# Patient Record
Sex: FEMALE | Race: WHITE | NOT HISPANIC OR LATINO | Employment: OTHER | URBAN - METROPOLITAN AREA
[De-identification: names, ages, dates, MRNs, and addresses within clinical notes are randomized per-mention and may not be internally consistent; named-entity substitution may affect disease eponyms.]

---

## 2022-11-23 ENCOUNTER — HOSPITAL ENCOUNTER (EMERGENCY)
Facility: HOSPITAL | Age: 52
Discharge: HOME | End: 2022-11-23
Attending: EMERGENCY MEDICINE
Payer: MEDICARE

## 2022-11-23 VITALS
HEIGHT: 60 IN | TEMPERATURE: 98 F | SYSTOLIC BLOOD PRESSURE: 127 MMHG | RESPIRATION RATE: 17 BRPM | DIASTOLIC BLOOD PRESSURE: 70 MMHG | WEIGHT: 135 LBS | HEART RATE: 78 BPM | OXYGEN SATURATION: 98 % | BODY MASS INDEX: 26.5 KG/M2
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
## 2022-11-23 DIAGNOSIS — S93.511A SPRAIN OF INTERPHALANGEAL JOINT OF RIGHT GREAT TOE, INITIAL ENCOUNTER: ICD-10-CM

## 2022-11-23 DIAGNOSIS — S93.602A FOOT SPRAIN, LEFT, INITIAL ENCOUNTER: Primary | ICD-10-CM

## 2022-11-23 PROCEDURE — 99283 EMERGENCY DEPT VISIT LOW MDM: CPT

## 2022-11-23 RX ORDER — ACETAMINOPHEN AND CODEINE PHOSPHATE 300; 30 MG/1; MG/1
1 TABLET ORAL EVERY 4 HOURS PRN
Qty: 14 TABLET | Refills: 0 | Status: SHIPPED | OUTPATIENT
Start: 2022-11-23 | End: 2022-12-03

## 2022-11-23 NOTE — DISCHARGE INSTRUCTIONS
Ice pack to the feet for 24-48 hours.  Neurovascular checks the toes.  Maintain Ace wrap compression dressing to the left foot.  Keep the right great toe and 2nd toe prashanth-taped until pain-free.  Use crutches for ambulation.  Ibuprofen every 6 hours.  Tylenol 3 as directed every 4 hours.

## 2022-11-23 NOTE — ED PROVIDER NOTES
Encounter Date: 11/23/2022       History     Chief Complaint   Patient presents with    Fall     walking down stairs, missed last step and fell forward. Did not hit head, no LOC.     Foot Injury     Right big toe and top of foot swelling and bruising, and outer left foot swelling and bruising     52-year-old female who had a prior CVA with some left-sided weakness, presents emergency room with complaints of pain to her left foot and right great toe as she had fallen as she was descending steps to greet family members the incident occurred last evening.  Patient denies any trauma to her head.  No neck pain.  No pain to her anterior chest, abdomen, back.  No pain to either upper extremity.  He denies any hip pain or pain in either knee.  No sensory changes to the feet.    Review of patient's allergies indicates:  No Known Allergies  No past medical history on file.  No past surgical history on file.  No family history on file.     Review of Systems   Constitutional:  Negative for diaphoresis.   Musculoskeletal:  Positive for arthralgias, gait problem, joint swelling and myalgias. Negative for back pain and neck pain.   Skin: Negative.    Neurological:  Negative for dizziness, syncope, numbness and headaches.   All other systems reviewed and are negative.    Physical Exam     Initial Vitals [11/23/22 0612]   BP Pulse Resp Temp SpO2   127/70 81 18 97.9 °F (36.6 °C) 96 %      MAP       --         Physical Exam    Vitals reviewed.  Constitutional: She appears well-developed and well-nourished. No distress.   HENT:   Head: Normocephalic and atraumatic.   Musculoskeletal:         General: Tenderness and edema present.      Comments: Swelling and tenderness is present over the dorsal lateral left foot.  Peripheral pulses intact.  Capillary refill normal.  No pain to palpation over the calcaneus or bimalleolar region.  No calf or knee pain.  No tenderness over the left toes.  Achilles intact    Examination of the right foot  reveals some tenderness over the great toe but no deformity noted.  Nailbeds are normal.  There is no pain over the metatarsals, calcaneus, bimalleolar ankle area.  No pain to palpation over the 2nd through 5th toe.     Neurological: She is alert and oriented to person, place, and time. GCS score is 15. GCS eye subscore is 4. GCS verbal subscore is 5. GCS motor subscore is 6.   Skin: Skin is warm and dry.       ED Course   Procedures  Labs Reviewed - No data to display       Imaging Results              X-Ray Foot Complete Bilateral (Final result)  Result time 11/23/22 07:21:56   Procedure changed from X-Ray Foot Complete Left     Final result by Benjy Cochran MD (11/23/22 07:21:56)                   Impression:      No acute abnormality throughout bilateral feet.      Electronically signed by: Benjy Cochran MD  Date:    11/23/2022  Time:    07:21               Narrative:    EXAMINATION:  XR FOOT COMPLETE 3 VIEW BILATERAL    CLINICAL HISTORY:  fall; Unspecified fall, initial encounter    FINDINGS:  Three views of bilateral feet show no fracture, dislocation, or destructive osseous lesion. Right 1st MTP joint tibial sesamoid and left 1st MTP joint fibular sesamoid are bipartite.  Calcaneal enthesophytes near plantar fascia origin noted bilaterally.  Soft tissues are unremarkable.                                       Medications - No data to display                           Clinical Impression:   Final diagnoses:  [W19.XXXA] Fall  [S93.602A] Foot sprain, left, initial encounter (Primary)  [S93.511A] Sprain of interphalangeal joint of right great toe, initial encounter      ED Disposition Condition    Discharge Stable          ED Prescriptions       Medication Sig Dispense Start Date End Date Auth. Provider    acetaminophen-codeine 300-30mg (TYLENOL #3) 300-30 mg Tab Take 1 tablet by mouth every 4 (four) hours as needed. 14 tablet 11/23/2022 12/3/2022 Billy Mackay Jr., MD          Follow-up Information        Follow up With Specialties Details Why Contact Info    Your Primary Care Doctor  In 1 week As needed for [place]              Billy Mackay Jr., MD  11/23/22 [id number]